# Patient Record
Sex: MALE | Race: WHITE | NOT HISPANIC OR LATINO | Employment: FULL TIME | URBAN - METROPOLITAN AREA
[De-identification: names, ages, dates, MRNs, and addresses within clinical notes are randomized per-mention and may not be internally consistent; named-entity substitution may affect disease eponyms.]

---

## 2017-08-03 ENCOUNTER — OFFICE VISIT (OUTPATIENT)
Dept: NEPHROLOGY | Facility: CLINIC | Age: 27
End: 2017-08-03
Payer: COMMERCIAL

## 2017-08-03 ENCOUNTER — LAB VISIT (OUTPATIENT)
Dept: LAB | Facility: HOSPITAL | Age: 27
End: 2017-08-03
Attending: INTERNAL MEDICINE
Payer: COMMERCIAL

## 2017-08-03 VITALS
SYSTOLIC BLOOD PRESSURE: 104 MMHG | HEIGHT: 64 IN | BODY MASS INDEX: 23.6 KG/M2 | HEART RATE: 58 BPM | DIASTOLIC BLOOD PRESSURE: 78 MMHG | WEIGHT: 138.25 LBS

## 2017-08-03 DIAGNOSIS — E83.39 HYPOPHOSPHATEMIA: Primary | ICD-10-CM

## 2017-08-03 DIAGNOSIS — E83.52 HYPERCALCEMIA: ICD-10-CM

## 2017-08-03 DIAGNOSIS — E83.39 HYPOPHOSPHATEMIA: ICD-10-CM

## 2017-08-03 PROCEDURE — 36415 COLL VENOUS BLD VENIPUNCTURE: CPT | Mod: PO

## 2017-08-03 PROCEDURE — 82306 VITAMIN D 25 HYDROXY: CPT

## 2017-08-03 PROCEDURE — 99999 PR PBB SHADOW E&M-NEW PATIENT-LVL III: CPT | Mod: PBBFAC,,, | Performed by: INTERNAL MEDICINE

## 2017-08-03 PROCEDURE — 3008F BODY MASS INDEX DOCD: CPT | Mod: S$GLB,,, | Performed by: INTERNAL MEDICINE

## 2017-08-03 PROCEDURE — 83970 ASSAY OF PARATHORMONE: CPT

## 2017-08-03 PROCEDURE — 80069 RENAL FUNCTION PANEL: CPT

## 2017-08-03 PROCEDURE — 99205 OFFICE O/P NEW HI 60 MIN: CPT | Mod: S$GLB,,, | Performed by: INTERNAL MEDICINE

## 2017-08-03 RX ORDER — CALCITRIOL 0.25 UG/1
2 CAPSULE ORAL 2 TIMES DAILY
Refills: 0 | COMMUNITY
Start: 2017-06-02 | End: 2017-08-03 | Stop reason: SDUPTHER

## 2017-08-03 RX ORDER — POTASSIUM PHOSPHATE, MONOBASIC AND SODIUM PHOSPHATE, MONOBASIC, ANHYDROUS 305; 700 MG/1; MG/1
3 TABLET, COATED ORAL 4 TIMES DAILY
Refills: 0 | COMMUNITY
Start: 2017-06-02 | End: 2017-08-03 | Stop reason: SDUPTHER

## 2017-08-03 RX ORDER — POTASSIUM PHOSPHATE, MONOBASIC AND SODIUM PHOSPHATE, MONOBASIC, ANHYDROUS 305; 700 MG/1; MG/1
3 TABLET, COATED ORAL 3 TIMES DAILY
Qty: 270 EACH | Refills: 6 | Status: SHIPPED | OUTPATIENT
Start: 2017-08-03 | End: 2017-08-30 | Stop reason: SDUPTHER

## 2017-08-03 RX ORDER — CALCITRIOL 0.25 UG/1
0.5 CAPSULE ORAL DAILY
Qty: 30 CAPSULE | Refills: 6 | Status: SHIPPED | OUTPATIENT
Start: 2017-08-03 | End: 2017-08-30 | Stop reason: SDUPTHER

## 2017-08-03 NOTE — PROGRESS NOTES
NEPHROLOGY CONSULTATION    PHYSICIAN REQUESTING THE CONSULT: Self-referred    REASON FOR CONSULTATION: Hypophosphtemia    27 y.o. male with history of hypophosphatemia, hypercalcemia presents to the renal clinic for evaluation of hypophosphatemia. Patient is self-referred. He reports long-standing history of hypophosphatemia and hypercalcemia and has been on life-long (since childhood) Calcitriol and K-Phos supplementation. Patient has seen several Nephrologists in past. He last saw Dr. Austin Gracia ([place], [place]) about 4 years ago (Physician is now retired). 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 Patient today presents to the clinic without any major complaints. Specifically, he denies any headaches, congenital hearing loss, chest pain, SOB, hemoptysis, abdominal or flank pain, diarrhea, nausea/vomiting, hematuria, dysuria, LE swelling, rashes, hand/foot paresthesia, nasal congestion, frequent fractures. Patient denies any NSAID use. Patient denies any history of HTN, DM2, heart disease, anemia, thyroid disease. He denies any surgical history. He denies any history of calcium or phosphorus abnormalities in his family. Last lab work from 5/2/2013 revealed phosphorus of 2.5, calcium of 10.9, creatinine of 1.27, potassium of 4.3, CO2 of 31.       Past Medical History:   Diagnosis Date    Hypophosphatemia        History reviewed. No pertinent surgical history.    Review of patient's allergies indicates:  No Known Allergies    Current Outpatient Prescriptions   Medication Sig Dispense Refill    calcitRIOL (ROCALTROL) 0.25 MCG Cap Take 2 capsules (0.5 mcg total) by mouth once daily. 30 capsule 6    K-PHOS NO 2 305-700 mg Tab Take 3 tablets by mouth 3 (three) times daily. 270 each 6     No current facility-administered medications for this visit.        Family History   Problem Relation Age of Onset    Anemia Neg Hx     Cancer  "Neg Hx     Hypertension Neg Hx     Kidney disease Neg Hx        Social History     Social History    Marital status: Single     Spouse name: N/A    Number of children: N/A    Years of education: N/A     Occupational History    Not on file.     Social History Main Topics    Smoking status: Not on file    Smokeless tobacco: Not on file    Alcohol use Not on file    Drug use: Unknown    Sexual activity: Not on file     Other Topics Concern    Not on file     Social History Narrative    No narrative on file       Review of Systems:  1. GENERAL: patient denies any fever, weight changes, generalized weakness, dizziness.  2. HEENT: patient denies headaches, visual disturbances, swallowing problems, sinus pain, nasal congestion.  3. CARDIOVASCULAR: patient denies chest pain, palpitations.  4. PULMONARY: patient denies SOB, coughing, hemoptysis, wheezing.  5. GASTROINTESTINAL: patient denies abdominal pain, nausea, vomiting, diarrhea.  6. GENITOURINARY: patient denies dysuria, hematuria, hesitancy, frequency.  7. EXTREMITIES: patient denies LE edema, LE cramping.  8. DERMATOLOGY: patient denies rashes, ulcers.  9. NEURO: patient denies tremors, extremity weakness, extremity numbness/tingling.  10. MUSCULOSKELETAL: patient denies joint pain, joint swelling.  11. HEMATOLOGY: patient denies rectal or gum bleeding.  12: PSYCH: patient denies anxiety, depression.      PHYSICAL EXAM:    /78   Pulse (!) 58   Ht 5' 4" (1.626 m)   Wt 62.7 kg (138 lb 3.7 oz)   BMI 23.73 kg/m²     GENERAL: Pleasant skinny gentleman presents to clinic with non-labored breathing.  HEENT: PER, no nasal discharge, no icterus, no oral exudates, moist mucosal membranes.  NECK: no thyroid mass, no lymphadenopathy.  HEART: RRR S1/S2, no rubs, good peripheral pulses.  LUNGS: CTA bilaterally, no wheezing, breathing is nonlabored.  ABDOMEN: soft, nontender, not distended, bowel sounds are present, no abdominal hernia.  EXTREM: no LE " edema.  SKIN: no rashes, skin is warm and dry.  NEURO: A & O x 3, no obvious focal signs.    LABORATORY RESULTS:    OUTSIDE LABS from 5/2/13:  Na-140, K-4.3, CO2-31, BUN-17, Cr-1.27, Ca-10.9, Alb-4.3, P-2.5, Mg-1.9, Uric acid-7.8    From 12/10/12:  25-OH Vitamin D: 26    Renal US from 4/8/12:  Right kidney 9.6 cm, left kidney 11.3 cm, no hydronephrosis or calculi, left simple cyst.           ASSESSMENT AND PLAN:  27 y.o. male with history of hypophosphatemia, hypercalcemia presents to the renal clinic for evaluation of hypophosphatemia.    1. Hypophospatemia: Patient presents with history of mild hypophosphatemia and mild hypercalcemia. He reports that he has been on medications since childhood and currently uses Rocaltrol and K-Phos supplements. Patient presents medical records from various physicians that he has seen in past. Dr. 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 However, formal genetic testing has never been done as per patient. Patient also denies any family history of calcium or phosphorus abnormalities. Patient's electrolytes will be monitored closely and he will return to clinic in 2-3 weeks for follow up. Will order renal panel, urinalysis, PTH level, vitamin D level and get 24 urine for calcium and phosphorus.    2. Renal: past labs indicate stable renal function with last creatinine from 5/2/13 at 1.27. Will get repeat renal panel.     3. Acid base status: No acute issues. Will follow up with repeat renal panel.     4. Volume: Euvolemic.     5. Hypertension: Good BP control.     6. Medications: Reviewed. Agree with current medical regimen.       Thank you very much for this consult. Please see my note in Epic for recommendations.    Total time spent was about 45 min. 50 % or more was spend on counseling about treatment options disease etiology. Level 5 consult.

## 2017-08-04 LAB
25(OH)D3+25(OH)D2 SERPL-MCNC: 28 NG/ML
ALBUMIN SERPL BCP-MCNC: 4.1 G/DL
ANION GAP SERPL CALC-SCNC: 11 MMOL/L
BUN SERPL-MCNC: 19 MG/DL
CALCIUM SERPL-MCNC: 10.1 MG/DL
CHLORIDE SERPL-SCNC: 105 MMOL/L
CO2 SERPL-SCNC: 23 MMOL/L
CREAT SERPL-MCNC: 1.3 MG/DL
EST. GFR  (AFRICAN AMERICAN): >60 ML/MIN/1.73 M^2
EST. GFR  (NON AFRICAN AMERICAN): >60 ML/MIN/1.73 M^2
GLUCOSE SERPL-MCNC: 71 MG/DL
PHOSPHATE SERPL-MCNC: 2.5 MG/DL
POTASSIUM SERPL-SCNC: 4.4 MMOL/L
PTH-INTACT SERPL-MCNC: 344 PG/ML
SODIUM SERPL-SCNC: 139 MMOL/L

## 2017-08-07 ENCOUNTER — TELEPHONE (OUTPATIENT)
Dept: PHARMACY | Facility: CLINIC | Age: 27
End: 2017-08-07

## 2017-08-08 ENCOUNTER — LAB VISIT (OUTPATIENT)
Dept: LAB | Facility: HOSPITAL | Age: 27
End: 2017-08-08
Attending: INTERNAL MEDICINE
Payer: COMMERCIAL

## 2017-08-08 DIAGNOSIS — E83.39 HYPOPHOSPHATEMIA: ICD-10-CM

## 2017-08-08 DIAGNOSIS — E83.52 HYPERCALCEMIA: ICD-10-CM

## 2017-08-08 PROCEDURE — 82340 ASSAY OF CALCIUM IN URINE: CPT

## 2017-08-08 PROCEDURE — 84105 ASSAY OF URINE PHOSPHORUS: CPT

## 2017-08-09 LAB
CALCIUM 24H UR-MRATE: 1 MG/HR
CALCIUM UR-MCNC: 1.7 MG/DL
CALCIUM URINE (MG/SPEC): 31 MG/SPEC
PHOSPHATE 24H UR-MRATE: 74 MG/HR
PHOSPHATE UR-MCNC: 96 MG/DL
PHOSPHORUS, URINE (MG/SPEC): 1776 MG/SPEC
URINE COLLECTION DURATION: 24 HR
URINE COLLECTION DURATION: 24 HR
URINE VOLUME: 1850 ML
URINE VOLUME: 1850 ML

## 2017-08-11 ENCOUNTER — TELEPHONE (OUTPATIENT)
Dept: NEPHROLOGY | Facility: CLINIC | Age: 27
End: 2017-08-11

## 2017-08-11 NOTE — TELEPHONE ENCOUNTER
Called pt. To reschedule appt. With Dr. Bailey on 8/22 as Dr. Bailey will be out of the office, no answer, left detailed voice message with this info. Advised pt. To return call to reschedule or reschedule through my [facility].

## 2017-08-30 ENCOUNTER — OFFICE VISIT (OUTPATIENT)
Dept: NEPHROLOGY | Facility: CLINIC | Age: 27
End: 2017-08-30
Payer: COMMERCIAL

## 2017-08-30 VITALS
SYSTOLIC BLOOD PRESSURE: 118 MMHG | WEIGHT: 134.69 LBS | BODY MASS INDEX: 22.99 KG/M2 | HEIGHT: 64 IN | DIASTOLIC BLOOD PRESSURE: 62 MMHG

## 2017-08-30 DIAGNOSIS — E83.39 HYPOPHOSPHATEMIA: ICD-10-CM

## 2017-08-30 DIAGNOSIS — E21.0 HYPERPARATHYROIDISM, PRIMARY: Primary | ICD-10-CM

## 2017-08-30 PROCEDURE — 99214 OFFICE O/P EST MOD 30 MIN: CPT | Mod: S$GLB,,, | Performed by: INTERNAL MEDICINE

## 2017-08-30 PROCEDURE — 99999 PR PBB SHADOW E&M-EST. PATIENT-LVL III: CPT | Mod: PBBFAC,,, | Performed by: INTERNAL MEDICINE

## 2017-08-30 PROCEDURE — 3008F BODY MASS INDEX DOCD: CPT | Mod: S$GLB,,, | Performed by: INTERNAL MEDICINE

## 2017-08-30 RX ORDER — POTASSIUM PHOSPHATE, MONOBASIC AND SODIUM PHOSPHATE, MONOBASIC, ANHYDROUS 305; 700 MG/1; MG/1
3 TABLET, COATED ORAL 3 TIMES DAILY
Qty: 270 EACH | Refills: 6 | Status: SHIPPED | OUTPATIENT
Start: 2017-08-30 | End: 2017-10-18 | Stop reason: SDUPTHER

## 2017-08-30 RX ORDER — CALCITRIOL 0.25 UG/1
0.5 CAPSULE ORAL DAILY
Qty: 30 CAPSULE | Refills: 6 | Status: SHIPPED | OUTPATIENT
Start: 2017-08-30 | End: 2017-10-18 | Stop reason: SDUPTHER

## 2017-08-30 NOTE — PROGRESS NOTES
PROGRESS NOTE FOR ESTABLISHED PATIENT    PHYSICIAN REQUESTING THE CONSULT: Self-referred    REASON FOR CONSULTATION: Hypophosphtemia    27 y.o. male with history of hypophosphatemia, hypercalcemia presents to the renal clinic for evaluation of hypophosphatemia.     Patient has no complaints today.           Past Medical History:   Diagnosis Date    Hypophosphatemia        No past surgical history on file.    Review of patient's allergies indicates:  No Known Allergies    Current Outpatient Prescriptions   Medication Sig Dispense Refill    calcitRIOL (ROCALTROL) 0.25 MCG Cap Take 2 capsules (0.5 mcg total) by mouth once daily. 30 capsule 6    K-PHOS NO 2 305-700 mg Tab Take 3 tablets by mouth 3 (three) times daily. 270 each 6     No current facility-administered medications for this visit.        Family History   Problem Relation Age of Onset    Anemia Neg Hx     Cancer Neg Hx     Hypertension Neg Hx     Kidney disease Neg Hx        Social History     Social History    Marital status: Single     Spouse name: N/A    Number of children: N/A    Years of education: N/A     Occupational History    Not on file.     Social History Main Topics    Smoking status: Not on file    Smokeless tobacco: Not on file    Alcohol use Not on file    Drug use: Unknown    Sexual activity: Not on file     Other Topics Concern    Not on file     Social History Narrative    No narrative on file       Review of Systems:  1. GENERAL: patient denies any fever, weight changes, generalized weakness, dizziness.  2. HEENT: patient denies headaches, visual disturbances, swallowing problems, sinus pain, nasal congestion.  3. CARDIOVASCULAR: patient denies chest pain, palpitations.  4. PULMONARY: patient denies SOB, coughing, hemoptysis, wheezing.  5. GASTROINTESTINAL: patient denies abdominal pain, nausea, vomiting, diarrhea.  6. GENITOURINARY: patient denies dysuria, hematuria, hesitancy, frequency.  7. EXTREMITIES: patient denies  "LE edema, LE cramping.  8. DERMATOLOGY: patient denies rashes, ulcers.  9. NEURO: patient denies tremors, extremity weakness, extremity numbness/tingling.  10. MUSCULOSKELETAL: patient denies joint pain, joint swelling.  11. HEMATOLOGY: patient denies rectal or gum bleeding.  12: PSYCH: patient denies anxiety, depression.      PHYSICAL EXAM:    /62   Ht 5' 4" (1.626 m)   Wt 61.1 kg (134 lb 11.2 oz)   BMI 23.12 kg/m²     GENERAL: Pleasant skinny gentleman presents to clinic with non-labored breathing.  HEENT: PER, no nasal discharge, no icterus, no oral exudates, moist mucosal membranes.  NECK: no thyroid mass, no lymphadenopathy.  HEART: RRR S1/S2, no rubs, good peripheral pulses.  LUNGS: CTA bilaterally, no wheezing, breathing is nonlabored.  ABDOMEN: soft, nontender, not distended, bowel sounds are present, no abdominal hernia.  EXTREM: no LE edema.  SKIN: no rashes, skin is warm and dry.  NEURO: A & O x 3, no obvious focal signs.    LABORATORY RESULTS:    Lab Results   Component Value Date    CREATININE 1.3 08/03/2017    BUN 19 08/03/2017     08/03/2017    K 4.4 08/03/2017     08/03/2017    CO2 23 08/03/2017     Lab Results   Component Value Date    .0 (H) 08/03/2017    CALCIUM 10.1 08/03/2017    PHOS 2.5 (L) 08/03/2017     Lab Results   Component Value Date    ALBUMIN 4.1 08/03/2017     No results found for: WBC, HGB, HCT, MCV, PLT     24 urinary phosphorus:  74 mg/hr (high)      OUTSIDE LABS from 5/2/13:  Na-140, K-4.3, CO2-31, BUN-17, Cr-1.27, Ca-10.9, Alb-4.3, P-2.5, Mg-1.9, Uric acid-7.8    From 12/10/12:  25-OH Vitamin D: 26    Renal US from 4/8/12:  Right kidney 9.6 cm, left kidney 11.3 cm, no hydronephrosis or calculi, left simple cyst.           ASSESSMENT AND PLAN:  27 y.o. male with history of hypophosphatemia, hypercalcemia presents to the renal clinic for evaluation of hypophosphatemia.    1. Hypophospatemia: Patient presents with history of mild hypophosphatemia and mild " hypercalcemia. Hypercalcemia has now resolved. PTH is elevated at 344. Patient's results are most consistent with primary hyperparathyroidism. Patient will continue Calcitriol and K-Phos supplements for now. Will make referral to Endocrinology for future follow up. Possibility of surgical intervention (parathyroidectomy) was discussed.     2. Renal: stable with creatinine at 1.3.     3. Acid base status: No acute issues.     4. Volume: Euvolemic.     5. Hypertension: Good BP control.     6. Medications: Reviewed. Agree with current medical regimen.

## 2017-10-18 DIAGNOSIS — E83.39 HYPOPHOSPHATEMIA: ICD-10-CM

## 2017-10-18 RX ORDER — POTASSIUM PHOSPHATE, MONOBASIC AND SODIUM PHOSPHATE, MONOBASIC, ANHYDROUS 305; 700 MG/1; MG/1
3 TABLET, COATED ORAL 3 TIMES DAILY
Qty: 270 EACH | Refills: 11 | Status: SHIPPED | OUTPATIENT
Start: 2017-10-18 | End: 2017-11-09 | Stop reason: SDUPTHER

## 2017-10-18 RX ORDER — CALCITRIOL 0.25 UG/1
0.5 CAPSULE ORAL DAILY
Qty: 60 CAPSULE | Refills: 11 | Status: SHIPPED | OUTPATIENT
Start: 2017-10-18 | End: 2018-01-24 | Stop reason: SDUPTHER

## 2017-11-01 ENCOUNTER — PATIENT MESSAGE (OUTPATIENT)
Dept: NEPHROLOGY | Facility: CLINIC | Age: 27
End: 2017-11-01

## 2017-11-09 DIAGNOSIS — E83.39 HYPOPHOSPHATEMIA: ICD-10-CM

## 2017-11-09 RX ORDER — POTASSIUM PHOSPHATE, MONOBASIC AND SODIUM PHOSPHATE, MONOBASIC, ANHYDROUS 305; 700 MG/1; MG/1
3 TABLET, COATED ORAL 3 TIMES DAILY
Qty: 837 EACH | Refills: 3
Start: 2017-11-09 | End: 2018-10-01 | Stop reason: SDUPTHER

## 2017-11-29 DIAGNOSIS — E83.39 HYPOPHOSPHATEMIA: ICD-10-CM

## 2017-11-29 RX ORDER — POTASSIUM PHOSPHATE, MONOBASIC AND SODIUM PHOSPHATE, MONOBASIC, ANHYDROUS 305; 700 MG/1; MG/1
3 TABLET, COATED ORAL 3 TIMES DAILY
Qty: 810 EACH | Refills: 3 | Status: SHIPPED | OUTPATIENT
Start: 2017-11-29 | End: 2021-10-06

## 2018-01-24 DIAGNOSIS — E83.39 HYPOPHOSPHATEMIA: ICD-10-CM

## 2018-01-24 RX ORDER — CALCITRIOL 0.25 UG/1
0.5 CAPSULE ORAL DAILY
Qty: 180 CAPSULE | Refills: 3 | OUTPATIENT
Start: 2018-01-24

## 2018-01-24 RX ORDER — CALCITRIOL 0.25 UG/1
0.5 CAPSULE ORAL DAILY
Qty: 180 CAPSULE | Refills: 3 | Status: SHIPPED | OUTPATIENT
Start: 2018-01-24 | End: 2021-10-06

## 2018-07-06 ENCOUNTER — PATIENT MESSAGE (OUTPATIENT)
Dept: ENDOCRINOLOGY | Facility: CLINIC | Age: 28
End: 2018-07-06

## 2018-07-09 ENCOUNTER — PATIENT MESSAGE (OUTPATIENT)
Dept: ENDOCRINOLOGY | Facility: CLINIC | Age: 28
End: 2018-07-09

## 2018-07-25 ENCOUNTER — OFFICE VISIT (OUTPATIENT)
Dept: ENDOCRINOLOGY | Facility: CLINIC | Age: 28
End: 2018-07-25
Payer: COMMERCIAL

## 2018-07-25 ENCOUNTER — LAB VISIT (OUTPATIENT)
Dept: LAB | Facility: HOSPITAL | Age: 28
End: 2018-07-25
Attending: INTERNAL MEDICINE
Payer: COMMERCIAL

## 2018-07-25 VITALS
HEART RATE: 57 BPM | WEIGHT: 133.19 LBS | BODY MASS INDEX: 22.74 KG/M2 | HEIGHT: 64 IN | TEMPERATURE: 97 F | SYSTOLIC BLOOD PRESSURE: 116 MMHG | DIASTOLIC BLOOD PRESSURE: 82 MMHG

## 2018-07-25 DIAGNOSIS — E21.3 HYPERPARATHYROIDISM: ICD-10-CM

## 2018-07-25 DIAGNOSIS — E83.39 HYPOPHOSPHATEMIA: Primary | ICD-10-CM

## 2018-07-25 DIAGNOSIS — E83.39 HYPOPHOSPHATEMIA: ICD-10-CM

## 2018-07-25 PROCEDURE — 83970 ASSAY OF PARATHORMONE: CPT

## 2018-07-25 PROCEDURE — 83735 ASSAY OF MAGNESIUM: CPT

## 2018-07-25 PROCEDURE — 36415 COLL VENOUS BLD VENIPUNCTURE: CPT | Mod: PO

## 2018-07-25 PROCEDURE — 84100 ASSAY OF PHOSPHORUS: CPT

## 2018-07-25 PROCEDURE — 99999 PR PBB SHADOW E&M-EST. PATIENT-LVL III: CPT | Mod: PBBFAC,,, | Performed by: INTERNAL MEDICINE

## 2018-07-25 PROCEDURE — 99244 OFF/OP CNSLTJ NEW/EST MOD 40: CPT | Mod: S$GLB,,, | Performed by: INTERNAL MEDICINE

## 2018-07-25 PROCEDURE — 82306 VITAMIN D 25 HYDROXY: CPT

## 2018-07-25 PROCEDURE — 80053 COMPREHEN METABOLIC PANEL: CPT

## 2018-07-25 NOTE — LETTER
July 30, 2018      Salinas Bailey MD  [address]  [place] [address]           [facility] - Endocrinology  [address]  [address]  [place] [address]  Phone: [phone]  Fax: [phone]          Patient: Alex Stringer   MR Number: [id number]   YOB: 1990   Date of Visit: 7/25/2018       Dear Dr. Salinas Bailey:    Thank you for referring Alex Stringer to me for evaluation. Attached you will find relevant portions of my assessment and plan of care.    If you have questions, please do not hesitate to call me. I look forward to following Alex Stringer along with you.    Sincerely,    Allison Santos MD    Enclosure  CC:  No Recipients    If you would like to receive this communication electronically, please contact [email or web address] or [phone] to request more information on [facility] access.    For providers and/or their staff who would like to refer a patient to [facility], please contact us through our one-stop-shop provider referral line, [facility], at [phone].    If you feel you have received this communication in error or would no longer like to receive these types of communications, please e-mail [email or web address]

## 2018-07-25 NOTE — PROGRESS NOTES
""This note will be shared with the patient"Subjective:       Patient ID: Alex Stringer is a 28 y.o. male.  Patient is new to me  Chief Complaint: Hyperparathyroidism    HPI    Consultation was requested by Dr. Salinas Bailey    Patient carries a diagnosis of X linked hypophosphatemia since childhood.  Records were reviewed.  He has not had the genetic studies however he has been seen by many specialists and he says usually by nephrologist.  He was followed by doctors at [facility] when he lived in [place].  He now lives in the [place] area.      He used to be on much more frequent and higher doses of calcitriol and phosphate(  Up to 2 g 6  times a day according to his  Notes from his pediatric nephrologist written in  January 2004) but now he takes about 2 capsules of his calcitriol daily and his potassium phosphate he tries to take 3 tablets twice a day.      He denies any bone pain or fractures.  He did have bowing of his lower extremities growing  Up, but he says he did not have any severe dental problems     since living in [place] he has just had some time adjusting and knows he needs to do more socially, has been having some stress and anxiety but denies depression     when he was last seen by Dr. Bailey  It was noticed that his parathyroid hormone  Was elevated at 344, his calcium was at the higher end of normal and he had elevated 24 urine phosphorus         in review of records his last renal ultrasound was April 2012 and it showed no nephrolithiasis, he had a 1 cm left sided renal cyst at the lower pole.  Today he appears a bit anxious and he states he would rather not ever get another renal ultrasound.  He has had several in the past and the 1 prior to 2012 was in 2008 according to what scanned in.     in review of records to prior to coming to [place] he was seeing a nephrologist in [place] and his calcium started becoming mildly elevated around 10.5 in 2011 and it appears his calcitriol dose " would be adjusted downward.  I do not see any parathyroid hormone levels other than what was done here at [facility]     he has been more tired and fatigued lately  I have reviewed the past medical, family and social history  Review of Systems   Constitutional: Positive for fatigue. Negative for appetite change, diaphoresis, fever and unexpected weight change.   HENT: Negative for sore throat and trouble swallowing.    Eyes: Negative for visual disturbance.   Respiratory: Negative for shortness of breath and wheezing.    Cardiovascular: Negative for chest pain, palpitations and leg swelling.   Gastrointestinal: Negative for abdominal pain, diarrhea, nausea and vomiting.   Endocrine: Negative for cold intolerance, heat intolerance, polydipsia, polyphagia and polyuria.   Genitourinary: Negative for difficulty urinating and dysuria.   Musculoskeletal: Negative for arthralgias and joint swelling.   Skin: Negative for color change and rash.   Neurological: Negative for dizziness, tremors, numbness and headaches.   Hematological: Negative for adenopathy.   Psychiatric/Behavioral: Negative for confusion, dysphoric mood and sleep disturbance. The patient is not nervous/anxious.        Objective:      Physical Exam   Constitutional: He is oriented to person, place, and time. He appears well-developed and well-nourished. No distress.   HENT:   Head: Normocephalic and atraumatic.   Right Ear: External ear normal.   Left Ear: External ear normal.   Mouth/Throat: Oropharynx is clear and moist. No oropharyngeal exudate.   He does appear to have craniosynostosis     Eyes: Conjunctivae and EOM are normal. Pupils are equal, round, and reactive to light. No scleral icterus.   Neck: No tracheal deviation present. No thyromegaly present.   Cardiovascular: Normal rate, regular rhythm, normal heart sounds and intact distal pulses.  Exam reveals no gallop and no friction rub.    No murmur heard.  Pulmonary/Chest: Effort normal and breath  sounds normal. No respiratory distress. He has no wheezes. He has no rales.   Abdominal: Soft. Bowel sounds are normal. He exhibits no distension and no mass. There is no tenderness. There is no rebound and no guarding. No hernia.   Musculoskeletal: He exhibits deformity. He exhibits no edema.   He does have some noticeable bowing of his lower extremities   Lymphadenopathy:     He has no cervical adenopathy.   Neurological: He is alert and oriented to person, place, and time. He has normal reflexes. No cranial nerve deficit.   Skin: Skin is warm and dry. No rash noted. He is not diaphoretic. No erythema.   Psychiatric:   He does have somewhat of an anxious affect   Vitals reviewed.        Lab Review:     Results for AINSLEY MONGE (MRN [id number]) as of 7/30/2018 13:26   Ref. Range 8/3/2017 16:56   Sodium Latest Ref Range: 136 - 145 mmol/L 139   Potassium Latest Ref Range: 3.5 - 5.1 mmol/L 4.4   Chloride Latest Ref Range: 95 - 110 mmol/L 105   CO2 Latest Ref Range: 23 - 29 mmol/L 23   Anion Gap Latest Ref Range: 8 - 16 mmol/L 11   BUN, Bld Latest Ref Range: 6 - 20 mg/dL 19   Creatinine Latest Ref Range: 0.5 - 1.4 mg/dL 1.3   eGFR if non African American Latest Ref Range: >60 mL/min/1.73 m^2 >60.0   eGFR if African American Latest Ref Range: >60 mL/min/1.73 m^2 >60.0   Glucose Latest Ref Range: 70 - 110 mg/dL 71   Calcium Latest Ref Range: 8.7 - 10.5 mg/dL 10.1   Phosphorus Latest Ref Range: 2.7 - 4.5 mg/dL 2.5 (L)   Albumin Latest Ref Range: 3.5 - 5.2 g/dL 4.1   Vit D, 25-Hydroxy Latest Ref Range: 30 - 96 ng/mL 28 (L)   PTH Latest Ref Range: 9.0 - 77.0 pg/mL 344.0 (H)       Assessment:     1. Hypophosphatemia  Comprehensive metabolic panel    Calcium, Timed Urine    Creatinine, urine, timed    Vitamin D    PTH, intact    Phosphorus    MAGNESIUM    Phosphorus, urine, timed   2. Hyperparathyroidism  Comprehensive metabolic panel    Calcium, Timed Urine    Creatinine, urine, timed    Vitamin D    PTH, intact     Phosphorus    MAGNESIUM    Phosphorus, urine, timed           patient with history of X-linked hypophosphatemia( which is associated with abnormalities in the PHEX gene) who now has  Hyperparathyroidism based on last labs that were done in 2017  In patients with this disorder who have become adults it is thought that the no longer need to be treated daily with medication unless they have symptoms such as pain, fracture or unless there having a pending surgery and in that case they can start treatment 3-6 months prior to their procedure.  Often the treatment which includes Calcitriol and phosphate can lead to  Secondary and tertiary   Hyperparathyroidism     I will re-evaluate his blood work as well as his 24 hr urine and if he again shows evidence of hyperparathyroidism especially with hypercalcemia as he had in the past I will have to recommend to Lower or consider stopping some or both of his supplements.      I also wanted to get an updated renal ultrasound to evaluate for nephrocalcinosis as if he were to have this it can make him more at risk for complications such as renal insufficiency however he was adamant that he does not wish to have a non other imaging study at this time and he also would not like to have many office visits but I discussed with him that based on the  The complexity of his disease which up until this point had been more stable he may require more frequent office visits as well as more frequent lab testing and patient voiced understanding to this and he also stated that he would not mind getting imaging testing if he had evidence of hypercalcemia or hyperparathyroidism     I will also  Consider ordering FGF 23  Levels as these are often elevated in this condition in the future  Plan:   Hypophosphatemia  -     Comprehensive metabolic panel; Future; Expected date: 07/25/2018  -     Calcium, Timed Urine; Future  -     Creatinine, urine, timed; Future  -     Vitamin D; Future; Expected date:  07/25/2018  -     PTH, intact; Future; Expected date: 07/25/2018  -     Phosphorus; Future; Expected date: 07/25/2018  -     MAGNESIUM; Future; Expected date: 07/25/2018  -     Phosphorus, urine, timed; Future    Hyperparathyroidism  -     Comprehensive metabolic panel; Future; Expected date: 07/25/2018  -     Calcium, Timed Urine; Future  -     Creatinine, urine, timed; Future  -     Vitamin D; Future; Expected date: 07/25/2018  -     PTH, intact; Future; Expected date: 07/25/2018  -     Phosphorus; Future; Expected date: 07/25/2018  -     MAGNESIUM; Future; Expected date: 07/25/2018  -     Phosphorus, urine, timed; Future    Other orders  -     Cancel: US Retroperitoneal Complete; Future; Expected date: 07/25/2018           Thank you to Dr. Salinas Bailey for this consultation

## 2018-07-26 LAB
25(OH)D3+25(OH)D2 SERPL-MCNC: 28 NG/ML
ALBUMIN SERPL BCP-MCNC: 4.6 G/DL
ALP SERPL-CCNC: 55 U/L
ALT SERPL W/O P-5'-P-CCNC: 11 U/L
ANION GAP SERPL CALC-SCNC: 10 MMOL/L
AST SERPL-CCNC: 17 U/L
BILIRUB SERPL-MCNC: 1.1 MG/DL
BUN SERPL-MCNC: 18 MG/DL
CALCIUM SERPL-MCNC: 10.8 MG/DL
CHLORIDE SERPL-SCNC: 105 MMOL/L
CO2 SERPL-SCNC: 25 MMOL/L
CREAT SERPL-MCNC: 1.2 MG/DL
EST. GFR  (AFRICAN AMERICAN): >60 ML/MIN/1.73 M^2
EST. GFR  (NON AFRICAN AMERICAN): >60 ML/MIN/1.73 M^2
GLUCOSE SERPL-MCNC: 78 MG/DL
MAGNESIUM SERPL-MCNC: 2 MG/DL
PHOSPHATE SERPL-MCNC: 2.9 MG/DL
POTASSIUM SERPL-SCNC: 4.6 MMOL/L
PROT SERPL-MCNC: 7.9 G/DL
PTH-INTACT SERPL-MCNC: 360 PG/ML
SODIUM SERPL-SCNC: 140 MMOL/L

## 2018-07-31 ENCOUNTER — PATIENT MESSAGE (OUTPATIENT)
Dept: ENDOCRINOLOGY | Facility: CLINIC | Age: 28
End: 2018-07-31

## 2018-07-31 DIAGNOSIS — E83.39 HYPOPHOSPHATEMIA: Primary | ICD-10-CM

## 2018-07-31 DIAGNOSIS — E83.52 HYPERCALCEMIA: ICD-10-CM

## 2018-07-31 DIAGNOSIS — E21.3 HYPERPARATHYROIDISM: ICD-10-CM

## 2018-08-01 ENCOUNTER — TELEPHONE (OUTPATIENT)
Dept: ENDOCRINOLOGY | Facility: CLINIC | Age: 28
End: 2018-08-01

## 2018-08-06 ENCOUNTER — LAB VISIT (OUTPATIENT)
Dept: LAB | Facility: HOSPITAL | Age: 28
End: 2018-08-06
Attending: INTERNAL MEDICINE
Payer: COMMERCIAL

## 2018-08-06 DIAGNOSIS — E83.39 HYPOPHOSPHATEMIA: ICD-10-CM

## 2018-08-06 DIAGNOSIS — E21.3 HYPERPARATHYROIDISM: ICD-10-CM

## 2018-08-06 PROCEDURE — 82340 ASSAY OF CALCIUM IN URINE: CPT

## 2018-08-06 PROCEDURE — 82570 ASSAY OF URINE CREATININE: CPT

## 2018-08-06 PROCEDURE — 84105 ASSAY OF URINE PHOSPHORUS: CPT

## 2018-08-07 LAB
CALCIUM 24H UR-MRATE: 3 MG/HR
CALCIUM UR-MCNC: 4.6 MG/DL
CALCIUM URINE (MG/SPEC): 83 MG/SPEC
CREAT 24H UR-MRATE: 51 MG/HR
CREAT UR-MCNC: 68 MG/DL
CREATININE, URINE (MG/SPEC): 1224 MG/SPEC
PHOSPHATE 24H UR-MRATE: 53.3 MG/HR
PHOSPHATE UR-MCNC: 71 MG/DL
PHOSPHORUS, URINE (MG/SPEC): 1278 MG/SPEC
URINE COLLECTION DURATION: 24 HR
URINE VOLUME: 1800 ML

## 2018-08-14 ENCOUNTER — PATIENT MESSAGE (OUTPATIENT)
Dept: ENDOCRINOLOGY | Facility: CLINIC | Age: 28
End: 2018-08-14

## 2018-08-28 ENCOUNTER — LAB VISIT (OUTPATIENT)
Dept: LAB | Facility: HOSPITAL | Age: 28
End: 2018-08-28
Attending: INTERNAL MEDICINE
Payer: COMMERCIAL

## 2018-08-28 DIAGNOSIS — E83.39 HYPOPHOSPHATEMIA: ICD-10-CM

## 2018-08-28 DIAGNOSIS — E21.3 HYPERPARATHYROIDISM: ICD-10-CM

## 2018-08-28 DIAGNOSIS — E83.52 HYPERCALCEMIA: ICD-10-CM

## 2018-08-28 LAB
25(OH)D3+25(OH)D2 SERPL-MCNC: 29 NG/ML
ANION GAP SERPL CALC-SCNC: 7 MMOL/L
BUN SERPL-MCNC: 20 MG/DL
CALCIUM SERPL-MCNC: 10.4 MG/DL
CHLORIDE SERPL-SCNC: 104 MMOL/L
CO2 SERPL-SCNC: 26 MMOL/L
CREAT SERPL-MCNC: 1.2 MG/DL
EST. GFR  (AFRICAN AMERICAN): >60 ML/MIN/1.73 M^2
EST. GFR  (NON AFRICAN AMERICAN): >60 ML/MIN/1.73 M^2
GLUCOSE SERPL-MCNC: 82 MG/DL
MAGNESIUM SERPL-MCNC: 2.2 MG/DL
PHOSPHATE SERPL-MCNC: 1.3 MG/DL
POTASSIUM SERPL-SCNC: 4.3 MMOL/L
PTH-INTACT SERPL-MCNC: 373 PG/ML
SODIUM SERPL-SCNC: 137 MMOL/L

## 2018-08-28 PROCEDURE — 82306 VITAMIN D 25 HYDROXY: CPT

## 2018-08-28 PROCEDURE — 83735 ASSAY OF MAGNESIUM: CPT

## 2018-08-28 PROCEDURE — 83970 ASSAY OF PARATHORMONE: CPT

## 2018-08-28 PROCEDURE — 80048 BASIC METABOLIC PNL TOTAL CA: CPT

## 2018-08-28 PROCEDURE — 84100 ASSAY OF PHOSPHORUS: CPT

## 2018-08-28 PROCEDURE — 83520 IMMUNOASSAY QUANT NOS NONAB: CPT

## 2018-09-04 LAB — FIBROBLAST GROWTH FACTOR 23: 1225 RU/ML

## 2018-09-05 ENCOUNTER — PATIENT MESSAGE (OUTPATIENT)
Dept: ENDOCRINOLOGY | Facility: CLINIC | Age: 28
End: 2018-09-05

## 2018-09-20 ENCOUNTER — PATIENT MESSAGE (OUTPATIENT)
Dept: ENDOCRINOLOGY | Facility: CLINIC | Age: 28
End: 2018-09-20

## 2018-10-01 ENCOUNTER — OFFICE VISIT (OUTPATIENT)
Dept: ENDOCRINOLOGY | Facility: CLINIC | Age: 28
End: 2018-10-01
Payer: COMMERCIAL

## 2018-10-01 VITALS
TEMPERATURE: 98 F | HEIGHT: 64 IN | HEART RATE: 53 BPM | WEIGHT: 133.19 LBS | SYSTOLIC BLOOD PRESSURE: 120 MMHG | DIASTOLIC BLOOD PRESSURE: 84 MMHG | BODY MASS INDEX: 22.74 KG/M2

## 2018-10-01 DIAGNOSIS — E21.3 HYPERPARATHYROIDISM: ICD-10-CM

## 2018-10-01 DIAGNOSIS — E83.39 HYPOPHOSPHATEMIA: Primary | ICD-10-CM

## 2018-10-01 DIAGNOSIS — E83.52 HYPERCALCEMIA: ICD-10-CM

## 2018-10-01 PROCEDURE — 99999 PR PBB SHADOW E&M-EST. PATIENT-LVL III: CPT | Mod: PBBFAC,,, | Performed by: INTERNAL MEDICINE

## 2018-10-01 PROCEDURE — 99214 OFFICE O/P EST MOD 30 MIN: CPT | Mod: S$GLB,,, | Performed by: INTERNAL MEDICINE

## 2018-10-01 PROCEDURE — 3008F BODY MASS INDEX DOCD: CPT | Mod: CPTII,S$GLB,, | Performed by: INTERNAL MEDICINE

## 2018-10-01 NOTE — PROGRESS NOTES
Patient ID: Alex Stringer is a 28 y.o. male.  Patient is here for follow up        Chief Complaint: Hyperparathyroidism (Hypophosphatemia)      HPI    Consultation was requested by Dr. Salinas Bailey    Patient carries a diagnosis of X linked hypophosphatemia since childhood.  Records were reviewed.  He has not had the genetic studies however he has been seen by many specialists and he says usually by nephrologist.  He was followed by doctors at [facility] when he lived in [place].  He now lives in the [place] area.      He used to be on much more frequent and higher doses of calcitriol and phosphate(  Up to 2 g 6  times a day according to his  Notes from his pediatric nephrologist written in  January 2004) but when I saw him initially in July 2018 he reported taking  about 2 capsules of his calcitriol daily and his potassium phosphate he tries to take 3 tablets twice a day.      He denies any bone pain or fractures.  He did have bowing of his lower extremities growing  Up, but he says he did not have any severe dental problems     since living in [place] he has just had some time adjusting and knows he needs to do more socially, has been having some stress and anxiety but denies depression     when he was last seen by Dr. Bailey  It was noticed that his parathyroid hormone  Was elevated at 344, his calcium was at the higher end of normal and he had elevated 24 urine phosphorus    After last visit I repeated his PTH and it was again high in the 300s and his calcium was mild Geeta elevated at 10.8.  His 24 urine was not elevated.  I recommended getting a renal ultrasound but he refused and I also recommended stopping his medication as he is an adult now and has completed his bone formation and his therapy of calcitriol and phosphate in the presence of hyperparathyroidism may worsen that condition and would only be needed should he have issues with persistent bone pain or has evidence of fractures or if he is in  need of Orthopedic surgery but he was reluctant to stop his medication completely        His Fibroblast growth factor 23 was very elevated consistent with his disease process         in review of records his last renal ultrasound was April 2012 and it showed no nephrolithiasis, he had a 1 cm left sided renal cyst at the lower pole.  Today he appears a bit anxious and he states he would rather not ever get another renal ultrasound.  He has had several in the past and the 1 prior to 2012 was in 2008 according to what scanned in.     in review of records to prior to coming to [place] he was seeing a nephrologist in [place] and his calcium started becoming mildly elevated around 10.5 in 2011 and it appears his calcitriol dose would be adjusted downward.  I do not see any parathyroid hormone levels other than what was done here at [facility]    He reports he takes about 5 K-Phos tablets daily and calcitriol 1 every other day and when he gets much much lower than this the only pain he experiences is pain in his teeth area.  He does not have any dental cavities    I have reviewed the past medical, family and social history    Review of Systems   Constitutional: Negative for appetite change, diaphoresis, fatigue, fever and unexpected weight change.   HENT: Negative for sore throat and trouble swallowing.    Eyes: Negative for visual disturbance.   Respiratory: Negative for shortness of breath and wheezing.    Cardiovascular: Negative for chest pain, palpitations and leg swelling.   Gastrointestinal: Negative for abdominal pain, diarrhea, nausea and vomiting.   Endocrine: Negative for cold intolerance, heat intolerance, polydipsia, polyphagia and polyuria.   Genitourinary: Negative for difficulty urinating and dysuria.   Musculoskeletal: Negative for arthralgias and joint swelling.   Skin: Negative for color change and rash.   Neurological: Negative for dizziness, tremors, numbness and headaches.   Hematological: Negative  for adenopathy.   Psychiatric/Behavioral: Negative for confusion, dysphoric mood and sleep disturbance. The patient is not nervous/anxious.        Objective:      Physical Exam   Constitutional: He appears well-developed and well-nourished. No distress.   HENT:   Head: Normocephalic and atraumatic.   Eyes: Conjunctivae are normal.   Neurological: He is alert. No sensory deficit.        Skin: Skin is warm and dry. No rash noted. He is not diaphoretic. No erythema.   Psychiatric: He has a normal mood and affect. His behavior is normal.   Vitals reviewed.        Lab Review:   Lab Visit on 08/28/2018   Component Date Value    PTH, Intact 08/28/2018 373.0*    Phosphorus 08/28/2018 1.3*    Vit D, 25-Hydroxy 08/28/2018 29*    Sodium 08/28/2018 137     Potassium 08/28/2018 4.3     Chloride 08/28/2018 104     CO2 08/28/2018 26     Glucose 08/28/2018 82     BUN, Bld 08/28/2018 20     Creatinine 08/28/2018 1.2     Calcium 08/28/2018 10.4     Anion Gap 08/28/2018 7*    eGFR if African American 08/28/2018 >60.0     eGFR if non African Amer* 08/28/2018 >60.0     Magnesium 08/28/2018 2.2     Fibroblast Growth Factor* 08/28/2018 1225*   Lab Visit on 08/06/2018   Component Date Value    Urine Volume 08/06/2018 1800     Urine Collection Duration 08/06/2018 24     Calcium, Urine 08/06/2018 4.6     Calcium, 24H Urine 08/06/2018 3*    CA Urine (mg/Spec) 08/06/2018 83     Urine Volume 08/06/2018 1800     Urine Collection Duration 08/06/2018 24     Urine, Creatinine 08/06/2018 68.0     Creatinine, Timed Urine 08/06/2018 51.0     Creatinine, Ur (mg/spec) 08/06/2018 1224.0     Urine Volume 08/06/2018 1800     Urine Collection Duration 08/06/2018 24     Urine Phosphorus 08/06/2018 71.0     Phosphorus, 24H Ur 08/06/2018 53.3     Phos, Ur mg/spec 08/06/2018 1278.0    Lab Visit on 07/25/2018   Component Date Value    Sodium 07/25/2018 140     Potassium 07/25/2018 4.6     Chloride 07/25/2018 105     CO2  07/25/2018 25     Glucose 07/25/2018 78     BUN, Bld 07/25/2018 18     Creatinine 07/25/2018 1.2     Calcium 07/25/2018 10.8*    Total Protein 07/25/2018 7.9     Albumin 07/25/2018 4.6     Total Bilirubin 07/25/2018 1.1*    Alkaline Phosphatase 07/25/2018 55     AST 07/25/2018 17     ALT 07/25/2018 11     Anion Gap 07/25/2018 10     eGFR if African American 07/25/2018 >60.0     eGFR if non  Amer* 07/25/2018 >60.0     Vit D, 25-Hydroxy 07/25/2018 28*    PTH, Intact 07/25/2018 360.0*    Phosphorus 07/25/2018 2.9     Magnesium 07/25/2018 2.0        Assessment:     1. Hypophosphatemia  Basic metabolic panel    Vitamin D    PTH, intact    Phosphorus    Albumin    Alkaline phosphatase, bone specific   2. Hyperparathyroidism  Basic metabolic panel    Vitamin D    PTH, intact    Phosphorus    Albumin    Alkaline phosphatase, bone specific   3. Hypercalcemia      patient with X-linked hyperphosphatemia.  He only notice that if he goes down significantly on his medication he only experiences pain in his teeth area but otherwise has no pain in his joints or extremities.  He still refuses to get imaging of his kidneys to evaluate for nephrocalcinosis.  I also discussed getting x-rays of his feet and lower extremities as a baseline to evaluate for any osteoarthritis or occult fractures even though he is asymptomatic and he declines.  Because of his hyperparathyroidism and his high normal to mildly hypercalcemia I do recommend lowering his K-Phos and he will try reducing gradually in the next 3 weeks to 0 of those tablets and he will changes calcitriol to 1 capsule daily, sometimes the Calcitriol can help to reduce the PTH level but I warned him that this might also cause elevation of serum calcium.  He will get labs below and 4 weeks    He had a question about Burosumab and I told him in adults this medication can be used in patients who may have bone pain, osteoarthritis or fractures or who may be  needing orthopedic surgery but without those conditions then this medication has not been proven to be necessary and also may be costly    I discussed complications of hypercalcemia and complications of hyperparathyroidism and ultimate treatments which can include cinacalcet and surgery/parathyroidectomy which may and the being multi parathyroid removal as parathyroid hyperplasia is often the culprit and this condition        Plan:   Hypophosphatemia  -     Basic metabolic panel; Future; Expected date: 10/01/2018  -     Vitamin D; Future; Expected date: 10/01/2018  -     PTH, intact; Future; Expected date: 10/01/2018  -     Phosphorus; Future; Expected date: 10/01/2018  -     Albumin; Future; Expected date: 10/01/2018  -     Alkaline phosphatase, bone specific; Future; Expected date: 10/01/2018    Hyperparathyroidism  -     Basic metabolic panel; Future; Expected date: 10/01/2018  -     Vitamin D; Future; Expected date: 10/01/2018  -     PTH, intact; Future; Expected date: 10/01/2018  -     Phosphorus; Future; Expected date: 10/01/2018  -     Albumin; Future; Expected date: 10/01/2018  -     Alkaline phosphatase, bone specific; Future; Expected date: 10/01/2018    Hypercalcemia          Follow-up in about 4 months (around 2/1/2019).    Labs prior to appointment? not applicable     Disclaimer:  This note may have been partially prepared using voice recognition software and  it may have not been extensively proofed, as such there could be errors within the text such as sound alike errors.

## 2018-10-08 ENCOUNTER — PATIENT MESSAGE (OUTPATIENT)
Dept: ENDOCRINOLOGY | Facility: CLINIC | Age: 28
End: 2018-10-08

## 2018-10-30 ENCOUNTER — LAB VISIT (OUTPATIENT)
Dept: LAB | Facility: HOSPITAL | Age: 28
End: 2018-10-30
Attending: INTERNAL MEDICINE
Payer: COMMERCIAL

## 2018-10-30 DIAGNOSIS — E21.3 HYPERPARATHYROIDISM: ICD-10-CM

## 2018-10-30 DIAGNOSIS — E83.39 HYPOPHOSPHATEMIA: ICD-10-CM

## 2018-10-30 LAB
25(OH)D3+25(OH)D2 SERPL-MCNC: 27 NG/ML
ALBUMIN SERPL BCP-MCNC: 4.2 G/DL
ANION GAP SERPL CALC-SCNC: 6 MMOL/L
BUN SERPL-MCNC: 19 MG/DL
CALCIUM SERPL-MCNC: 10.9 MG/DL
CHLORIDE SERPL-SCNC: 105 MMOL/L
CO2 SERPL-SCNC: 28 MMOL/L
CREAT SERPL-MCNC: 1.3 MG/DL
EST. GFR  (AFRICAN AMERICAN): >60 ML/MIN/1.73 M^2
EST. GFR  (NON AFRICAN AMERICAN): >60 ML/MIN/1.73 M^2
GLUCOSE SERPL-MCNC: 92 MG/DL
PHOSPHATE SERPL-MCNC: 2.7 MG/DL
POTASSIUM SERPL-SCNC: 4.4 MMOL/L
PTH-INTACT SERPL-MCNC: 362 PG/ML
SODIUM SERPL-SCNC: 139 MMOL/L

## 2018-10-30 PROCEDURE — 80048 BASIC METABOLIC PNL TOTAL CA: CPT

## 2018-10-30 PROCEDURE — 82306 VITAMIN D 25 HYDROXY: CPT

## 2018-10-30 PROCEDURE — 82040 ASSAY OF SERUM ALBUMIN: CPT

## 2018-10-30 PROCEDURE — 84100 ASSAY OF PHOSPHORUS: CPT

## 2018-10-30 PROCEDURE — 83970 ASSAY OF PARATHORMONE: CPT

## 2018-10-30 PROCEDURE — 84075 ASSAY ALKALINE PHOSPHATASE: CPT

## 2018-11-02 LAB — ALP BONE SERPL-MCNC: 13.6 UG/L

## 2018-12-05 ENCOUNTER — PATIENT MESSAGE (OUTPATIENT)
Dept: ENDOCRINOLOGY | Facility: CLINIC | Age: 28
End: 2018-12-05

## 2018-12-06 DIAGNOSIS — E21.0 HYPERPARATHYROIDISM, PRIMARY: ICD-10-CM

## 2018-12-06 DIAGNOSIS — E83.39 HYPOPHOSPHATEMIA: Primary | ICD-10-CM

## 2018-12-06 DIAGNOSIS — E83.52 HYPERCALCEMIA: ICD-10-CM

## 2018-12-11 ENCOUNTER — TELEPHONE (OUTPATIENT)
Dept: ENDOCRINOLOGY | Facility: CLINIC | Age: 28
End: 2018-12-11

## 2018-12-11 NOTE — TELEPHONE ENCOUNTER
----- Message from Allison Santos MD sent at 12/11/2018  8:09 AM CST -----  I am not sure if I sent this message but I ordered a PTH, BMP, and phosphorus.  Patient wanted labs schedule during the week of December 17 so you can schedule it any day that week and send him the appointment through the my [facility]

## 2018-12-18 ENCOUNTER — LAB VISIT (OUTPATIENT)
Dept: LAB | Facility: HOSPITAL | Age: 28
End: 2018-12-18
Attending: INTERNAL MEDICINE
Payer: COMMERCIAL

## 2018-12-18 DIAGNOSIS — E83.52 HYPERCALCEMIA: ICD-10-CM

## 2018-12-18 DIAGNOSIS — E21.0 HYPERPARATHYROIDISM, PRIMARY: ICD-10-CM

## 2018-12-18 DIAGNOSIS — E83.39 HYPOPHOSPHATEMIA: ICD-10-CM

## 2018-12-18 LAB
ANION GAP SERPL CALC-SCNC: 8 MMOL/L
BUN SERPL-MCNC: 24 MG/DL
CALCIUM SERPL-MCNC: 11.2 MG/DL
CHLORIDE SERPL-SCNC: 105 MMOL/L
CO2 SERPL-SCNC: 27 MMOL/L
CREAT SERPL-MCNC: 1.8 MG/DL
EST. GFR  (AFRICAN AMERICAN): 57.5 ML/MIN/1.73 M^2
EST. GFR  (NON AFRICAN AMERICAN): 49.8 ML/MIN/1.73 M^2
GLUCOSE SERPL-MCNC: 79 MG/DL
PHOSPHATE SERPL-MCNC: 2.4 MG/DL
POTASSIUM SERPL-SCNC: 4.2 MMOL/L
PTH-INTACT SERPL-MCNC: 321 PG/ML
SODIUM SERPL-SCNC: 140 MMOL/L

## 2018-12-18 PROCEDURE — 36415 COLL VENOUS BLD VENIPUNCTURE: CPT | Mod: PO

## 2018-12-18 PROCEDURE — 83970 ASSAY OF PARATHORMONE: CPT

## 2018-12-18 PROCEDURE — 84100 ASSAY OF PHOSPHORUS: CPT

## 2018-12-18 PROCEDURE — 80048 BASIC METABOLIC PNL TOTAL CA: CPT

## 2019-01-07 ENCOUNTER — PATIENT MESSAGE (OUTPATIENT)
Dept: ENDOCRINOLOGY | Facility: CLINIC | Age: 29
End: 2019-01-07

## 2019-02-13 ENCOUNTER — PATIENT MESSAGE (OUTPATIENT)
Dept: ENDOCRINOLOGY | Facility: CLINIC | Age: 29
End: 2019-02-13

## 2019-02-13 ENCOUNTER — TELEPHONE (OUTPATIENT)
Dept: ENDOCRINOLOGY | Facility: CLINIC | Age: 29
End: 2019-02-13

## 2019-02-13 NOTE — TELEPHONE ENCOUNTER
Spoke with pt and gave directions to the [facility] location.  Also advised pt that labs and US will be ordered if Dr. Santos deems necessary

## 2019-02-14 ENCOUNTER — OFFICE VISIT (OUTPATIENT)
Dept: ENDOCRINOLOGY | Facility: CLINIC | Age: 29
End: 2019-02-14
Payer: COMMERCIAL

## 2019-02-14 ENCOUNTER — LAB VISIT (OUTPATIENT)
Dept: LAB | Facility: HOSPITAL | Age: 29
End: 2019-02-14
Attending: INTERNAL MEDICINE
Payer: COMMERCIAL

## 2019-02-14 VITALS — BODY MASS INDEX: 22.81 KG/M2 | HEART RATE: 62 BPM | TEMPERATURE: 98 F | WEIGHT: 133.63 LBS | HEIGHT: 64 IN

## 2019-02-14 DIAGNOSIS — E83.39 HYPOPHOSPHATEMIA: ICD-10-CM

## 2019-02-14 DIAGNOSIS — N28.9 RENAL INSUFFICIENCY: ICD-10-CM

## 2019-02-14 DIAGNOSIS — E83.52 HYPERCALCEMIA: ICD-10-CM

## 2019-02-14 DIAGNOSIS — E21.0 HYPERPARATHYROIDISM, PRIMARY: ICD-10-CM

## 2019-02-14 DIAGNOSIS — E83.39 HYPOPHOSPHATEMIA: Primary | ICD-10-CM

## 2019-02-14 LAB
25(OH)D3+25(OH)D2 SERPL-MCNC: 24 NG/ML
ALBUMIN SERPL BCP-MCNC: 4.4 G/DL
ALBUMIN SERPL BCP-MCNC: 4.4 G/DL
ALP SERPL-CCNC: 60 U/L
ANION GAP SERPL CALC-SCNC: 6 MMOL/L
BUN SERPL-MCNC: 20 MG/DL
CALCIUM SERPL-MCNC: 11 MG/DL
CHLORIDE SERPL-SCNC: 104 MMOL/L
CO2 SERPL-SCNC: 29 MMOL/L
CREAT SERPL-MCNC: 1.4 MG/DL
EST. GFR  (AFRICAN AMERICAN): >60 ML/MIN/1.73 M^2
EST. GFR  (NON AFRICAN AMERICAN): >60 ML/MIN/1.73 M^2
GLUCOSE SERPL-MCNC: 85 MG/DL
PHOSPHATE SERPL-MCNC: 2.2 MG/DL
PHOSPHATE SERPL-MCNC: 2.2 MG/DL
POTASSIUM SERPL-SCNC: 4.5 MMOL/L
PTH-INTACT SERPL-MCNC: 343 PG/ML
SODIUM SERPL-SCNC: 139 MMOL/L

## 2019-02-14 PROCEDURE — 99214 OFFICE O/P EST MOD 30 MIN: CPT | Mod: S$GLB,,, | Performed by: INTERNAL MEDICINE

## 2019-02-14 PROCEDURE — 99999 PR PBB SHADOW E&M-EST. PATIENT-LVL IV: CPT | Mod: PBBFAC,,, | Performed by: INTERNAL MEDICINE

## 2019-02-14 PROCEDURE — 84075 ASSAY ALKALINE PHOSPHATASE: CPT

## 2019-02-14 PROCEDURE — 36415 COLL VENOUS BLD VENIPUNCTURE: CPT

## 2019-02-14 PROCEDURE — 3008F PR BODY MASS INDEX (BMI) DOCUMENTED: ICD-10-PCS | Mod: CPTII,S$GLB,, | Performed by: INTERNAL MEDICINE

## 2019-02-14 PROCEDURE — 3008F BODY MASS INDEX DOCD: CPT | Mod: CPTII,S$GLB,, | Performed by: INTERNAL MEDICINE

## 2019-02-14 PROCEDURE — 82306 VITAMIN D 25 HYDROXY: CPT

## 2019-02-14 PROCEDURE — 80069 RENAL FUNCTION PANEL: CPT

## 2019-02-14 PROCEDURE — 99999 PR PBB SHADOW E&M-EST. PATIENT-LVL IV: ICD-10-PCS | Mod: PBBFAC,,, | Performed by: INTERNAL MEDICINE

## 2019-02-14 PROCEDURE — 99214 PR OFFICE/OUTPT VISIT, EST, LEVL IV, 30-39 MIN: ICD-10-PCS | Mod: S$GLB,,, | Performed by: INTERNAL MEDICINE

## 2019-02-14 PROCEDURE — 83970 ASSAY OF PARATHORMONE: CPT

## 2019-02-14 NOTE — PROGRESS NOTES
Patient ID: Alex Stringer is a 29 y.o. male.  Patient is here for follow up        Chief Complaint: Abnormal Lab (hypophosphatemia)      HPI   Consultation was requested by Dr. Salinas Bailey    Patient carries a diagnosis of X linked hypophosphatemia since childhood.   He has not had the genetic studies however he has been seen by many specialists and he says usually by nephrologist.  He was followed by doctors at [facility] when he lived in [place].  He now lives in the [place] area.  I previously ordered FGF-23 levels and these were significantly high which helps to confirm his diagnosis.      He used to be on much more frequent and higher doses of calcitriol and phosphate(  Up to 2 g 6  times a day according to his  Notes from his pediatric nephrologist written in  January 2004) but when I saw him initially in July 2018 he reported taking  about 2 capsules of his calcitriol daily and his potassium phosphate he initially was taking 3 tablets twice a day.      He denies any bone pain or fractures except most recently in the past several months he has been experiencing pain in his T.  He did have bowing of his lower extremities growing  Up, but he says he did not have any severe dental problems     since living in [place] he has just had some time adjusting and knows he needs to do more socially, has been having some stress and anxiety but denies depression     when he was last seen by Dr. Bailey  It was noticed that his parathyroid hormone  Was elevated at 344, his calcium was at the higher end of normal and he had elevated 24 urine phosphorus    Since I have been seeing him his PTH remains elevated and his calcium levels have gradually increased and in December his GFR and creatinine worsened    His 24 urine was not elevated and in fact was low.  I recommended getting a renal ultrasound but he refused and I also recommended stopping his medication as he is an adult now and has completed his bone formation  and his therapy of calcitriol and phosphate in the presence of hyperparathyroidism may worsen that condition and would only be needed should he have issues with persistent bone pain or has evidence of fractures or if he is in need of Orthopedic surgery but he was reluctant to stop his medication completely    He is now down to Calcitriol one a day though at times may take 2 and kphos 2.5 to 3 a day               in review of records his last renal ultrasound was April 2012 and it showed no nephrolithiasis, he had a 1 cm left sided renal cyst at the lower pole.  Today as usual he is bit anxious and he states he       in review of records to prior to coming to [place] he was seeing a nephrologist in [place] and his calcium started becoming mildly elevated around 10.5 in 2011 and it appears his calcitriol dose would be adjusted downward.      He states he has seen a dentist who did not see any abnormalities in his bones.  He does not have any dental cavities    I also recommended in the past as a preventive method to take an x-ray of his lower extremities and ft areas to evaluate for any early arthritic degenerative or other bony process such as asymptomatic stress fracture but he declined    We discussed in the past and went over today my thoughts on Burosumab(Crysvita)- firstly it is controversial whether adult patient's should be treated but studies do show that it improved bone pain and feelings of well being and helps with myalgias as well and age in healing fractures and used for alleviating pain and healing fractures and sometimes can be used prior to an orthopedic surgery but should not be used in combination with Calcitriol or Upper phosphate or with significant renal insufficiency and this drug is not going to help his current issue of hyperparathyroidism and hypercalcemia and not to mention it is extreme expands    I have reviewed the past medical, family and social history    Review of Systems    Constitutional: Negative for appetite change, diaphoresis, fatigue, fever and unexpected weight change.   HENT: Negative for sore throat and trouble swallowing.         Pain in teeth   Eyes: Negative for visual disturbance.   Respiratory: Negative for shortness of breath and wheezing.    Cardiovascular: Negative for chest pain, palpitations and leg swelling.   Gastrointestinal: Negative for abdominal pain, diarrhea, nausea and vomiting.   Endocrine: Negative for cold intolerance, heat intolerance, polydipsia, polyphagia and polyuria.   Genitourinary: Negative for difficulty urinating and dysuria.   Musculoskeletal: Negative for arthralgias and joint swelling.   Skin: Negative for color change and rash.   Neurological: Negative for dizziness, tremors, numbness and headaches.   Hematological: Negative for adenopathy.   Psychiatric/Behavioral: Negative for confusion, dysphoric mood and sleep disturbance. The patient is not nervous/anxious.        Objective:      Physical Exam   Constitutional: He appears well-developed and well-nourished. No distress.   HENT:   Head: Normocephalic and atraumatic.   Eyes: Conjunctivae are normal. No scleral icterus.   Neurological: He is alert. No sensory deficit.        Skin: Skin is warm and dry. No rash noted. He is not diaphoretic. No erythema.   Psychiatric: He has a normal mood and affect. His behavior is normal.   Mood and affect are anxious and inappropriate at times   Vitals reviewed.        Lab Review:   Lab Visit on 12/18/2018   Component Date Value    PTH, Intact 12/18/2018 321.0*    Phosphorus 12/18/2018 2.4*    Sodium 12/18/2018 140     Potassium 12/18/2018 4.2     Chloride 12/18/2018 105     CO2 12/18/2018 27     Glucose 12/18/2018 79     BUN, Bld 12/18/2018 24*    Creatinine 12/18/2018 1.8*    Calcium 12/18/2018 11.2*    Anion Gap 12/18/2018 8     eGFR if  12/18/2018 57.5*    eGFR if non  Amer* 12/18/2018 49.8*   Lab Visit on  10/30/2018   Component Date Value    Sodium 10/30/2018 139     Potassium 10/30/2018 4.4     Chloride 10/30/2018 105     CO2 10/30/2018 28     Glucose 10/30/2018 92     BUN, Bld 10/30/2018 19     Creatinine 10/30/2018 1.3     Calcium 10/30/2018 10.9*    Anion Gap 10/30/2018 6*    eGFR if African American 10/30/2018 >60.0     eGFR if non African Amer* 10/30/2018 >60.0     Vit D, 25-Hydroxy 10/30/2018 27*    PTH, Intact 10/30/2018 362.0*    Phosphorus 10/30/2018 2.7     Albumin 10/30/2018 4.2     Alkaline Phos Bone Speci* 10/30/2018 13.6    Lab Visit on 08/28/2018   Component Date Value    PTH, Intact 08/28/2018 373.0*    Phosphorus 08/28/2018 1.3*    Vit D, 25-Hydroxy 08/28/2018 29*    Sodium 08/28/2018 137     Potassium 08/28/2018 4.3     Chloride 08/28/2018 104     CO2 08/28/2018 26     Glucose 08/28/2018 82     BUN, Bld 08/28/2018 20     Creatinine 08/28/2018 1.2     Calcium 08/28/2018 10.4     Anion Gap 08/28/2018 7*    eGFR if African American 08/28/2018 >60.0     eGFR if non African Amer* 08/28/2018 >60.0     Magnesium 08/28/2018 2.2     Fibroblast Growth Factor* 08/28/2018 1225*       Assessment:     1. Hypophosphatemia  Renal function panel    PTH, intact    Phosphorus    Vitamin D    Calcium, Timed Urine    Creatinine, urine, timed 24 Hours    NM Parathyroid Scan with SPECT Routine    US Soft Tissue Head Neck Thyroid    Alkaline phosphatase    Albumin    US Kidney   2. Hyperparathyroidism, primary  Renal function panel    PTH, intact    Phosphorus    Vitamin D    Calcium, Timed Urine    Creatinine, urine, timed 24 Hours    NM Parathyroid Scan with SPECT Routine    US Soft Tissue Head Neck Thyroid    Alkaline phosphatase    Albumin    US Kidney    DXA Bone Density Spine And Hip   3. Hypercalcemia  Renal function panel    PTH, intact    Phosphorus    Vitamin D    Calcium, Timed Urine    Creatinine, urine, timed 24 Hours    NM Parathyroid Scan with SPECT Routine    US Soft Tissue  Head Neck Thyroid    Alkaline phosphatase    Albumin    US Kidney   4. 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  This condition often will show multiple gland involvement and if so may require multiple gland parathyroidectomy.  He his calcium levels have worsened and he has developed renal insufficiency.  I will repeat labs and check another 24 urine calcium as well as assesses Z-score on bone density for any signs of bone loss.  Also he may have nephrocalcinosis that could be contributing to renal insufficiency.  I will also check imaging studies to localize parathyroid adenoma which will include parathyroid scan and thyroid ultrasound and now that he has renal insufficiency I recommended to patient to follow-up with his nephrologist    I also strongly recommended to patient to reduce calcitriol to 1 a day and K-Phos to 1 a day.  He does not want to completely stop them because he has more teeth pain he says    Can consider Sensipar if he is reluctant to have surgery  Plan:   Hypophosphatemia  -     Renal function panel; Future; Expected date: 02/14/2019  -     PTH, intact; Future; Expected date: 02/14/2019  -     Phosphorus; Future; Expected date: 02/14/2019  -     Vitamin D; Future; Expected date: 02/14/2019  -     Calcium, Timed Urine; Future  -     Creatinine, urine, timed 24 Hours; Future  -     NM Parathyroid Scan with SPECT Routine; Future; Expected date: 02/14/2019  -     US Soft Tissue Head Neck Thyroid; Future; Expected date: 02/14/2019  -     Alkaline phosphatase; Future; Expected date: 02/14/2019  -     Albumin; Future; Expected date: 02/14/2019  -     US Kidney; Future; Expected date: 02/14/2019    Hyperparathyroidism, primary  -     Renal function panel; Future; Expected date: 02/14/2019  -     PTH, intact; Future; Expected date: 02/14/2019  -     Phosphorus; Future; Expected date: 02/14/2019  -      Vitamin D; Future; Expected date: 02/14/2019  -     Calcium, Timed Urine; Future  -     Creatinine, urine, timed 24 Hours; Future  -     NM Parathyroid Scan with SPECT Routine; Future; Expected date: 02/14/2019  -     US Soft Tissue Head Neck Thyroid; Future; Expected date: 02/14/2019  -     Alkaline phosphatase; Future; Expected date: 02/14/2019  -     Albumin; Future; Expected date: 02/14/2019  -     US Kidney; Future; Expected date: 02/14/2019  -     DXA Bone Density Spine And Hip; Future; Expected date: 02/14/2019    Hypercalcemia  -     Renal function panel; Future; Expected date: 02/14/2019  -     PTH, intact; Future; Expected date: 02/14/2019  -     Phosphorus; Future; Expected date: 02/14/2019  -     Vitamin D; Future; Expected date: 02/14/2019  -     Calcium, Timed Urine; Future  -     Creatinine, urine, timed 24 Hours; Future  -     NM Parathyroid Scan with SPECT Routine; Future; Expected date: 02/14/2019  -     US Soft Tissue Head Neck Thyroid; Future; Expected date: 02/14/2019  -     Alkaline phosphatase; Future; Expected date: 02/14/2019  -     Albumin; Future; Expected date: 02/14/2019  -     US Kidney; Future; Expected date: 02/14/2019    Renal insufficiency          No Follow-up on file.    Labs prior to appointment? not applicable     Disclaimer:  This note may have been partially prepared using voice recognition software and  it may have not been extensively proofed, as such there could be errors within the text such as sound alike errors.

## 2019-02-14 NOTE — PATIENT INSTRUCTIONS
Make an appointment with Dr Bailey    Reduce calcitriol to one a day  And reduce  Kphos to one a day  Understanding Primary Hyperparathyroidism    The parathyroid glands are 4 tiny glands in the neck. They make parathyroid hormone (PTH). PTH controls the amount of calcium and phosphorus in your blood. Primary hyperparathyroidism is when there is too much PTH in your blood. It occurs when one or more of the glands are too active.  The job of PTH is to tell the body how to control calcium. Too much PTH means the body increases the amount of calcium in the blood. This leads to a problem called hypercalcemia. This is when the amount of calcium in the blood is too high. Hypercalcemia can cause serious health problems.  Causes  Hyperparathyroidism can occur when a parathyroid gland becomes enlarged. It can also occur as a complication of another health conditions, such as kidney failure or rickets. In these conditions, calcium is usually not high. This is called secondary hyperparathyroidism.  Whos at risk  The risk factors for this condition include:  · Being a woman (its less common in men)  · Being older (its more likely to occur with age)  · Having parents or siblings with the condition or other endocrine tumors  · Having certain kidney problems  · Taking certain medicines  · Having had radiation treatment in the head or neck  Symptoms  Symptoms of the condition can include:  · Muscle weakness  · Depression  · Tiredness  · Confusion and memory loss  · Poor memory  · Nausea and vomiting  · Pain in the stomach area (abdomen)  · Hard stools (constipation)  · Stomach ulcers  · Need to urinate often  · Kidney stones  · Joint or bone pain  · Bone disease (osteopenia or osteoporosis), an increase in bone fractures  · High blood pressure  · Increased thirst  Treatment  If primary hyperparathyroidism is not treated, it can get worse over time. Treatments include:  · Surgery. This may be done to remove any enlarged  parathyroid glands. This lets the amount of calcium in the blood go back to normal. You may need to take vitamin D and calcium supplements before the surgery. This will reduce the risk of low calcium after the surgery.   · Medicine. This lowers the amount of parathyroid hormone made by the overactive glands.   You and your healthcare provider can discuss your treatment options. Make sure to ask any questions you have.  Date Last Reviewed: 11/1/2016 © 2000-2017 [employer]. [address] [place] [address], [place] [address]. All rights reserved. This information is not intended as a substitute for professional medical care. Always follow your healthcare professional's instructions.

## 2019-03-03 ENCOUNTER — PATIENT MESSAGE (OUTPATIENT)
Dept: ENDOCRINOLOGY | Facility: CLINIC | Age: 29
End: 2019-03-03

## 2019-03-28 ENCOUNTER — TELEPHONE (OUTPATIENT)
Dept: RADIOLOGY | Facility: HOSPITAL | Age: 29
End: 2019-03-28

## 2021-06-01 ENCOUNTER — TELEPHONE (OUTPATIENT)
Dept: ENDOCRINOLOGY | Facility: CLINIC | Age: 31
End: 2021-06-01

## 2021-10-06 ENCOUNTER — OFFICE VISIT (OUTPATIENT)
Dept: ENDOCRINOLOGY | Facility: CLINIC | Age: 31
End: 2021-10-06
Payer: COMMERCIAL

## 2021-10-06 ENCOUNTER — PATIENT MESSAGE (OUTPATIENT)
Dept: ENDOCRINOLOGY | Facility: CLINIC | Age: 31
End: 2021-10-06

## 2021-10-06 VITALS
DIASTOLIC BLOOD PRESSURE: 84 MMHG | BODY MASS INDEX: 23.64 KG/M2 | SYSTOLIC BLOOD PRESSURE: 122 MMHG | RESPIRATION RATE: 18 BRPM | OXYGEN SATURATION: 99 % | HEIGHT: 64 IN | WEIGHT: 138.44 LBS | HEART RATE: 65 BPM

## 2021-10-06 DIAGNOSIS — E83.39 HYPOPHOSPHATEMIA: ICD-10-CM

## 2021-10-06 DIAGNOSIS — M90.80 X-LINKED HYPOPHOSPHATEMIC RICKETS: Primary | ICD-10-CM

## 2021-10-06 DIAGNOSIS — E83.31 X-LINKED HYPOPHOSPHATEMIC RICKETS: Primary | ICD-10-CM

## 2021-10-06 DIAGNOSIS — E21.2 TERTIARY HYPERPARATHYROIDISM: ICD-10-CM

## 2021-10-06 PROBLEM — E83.52 HYPERCALCEMIA: Status: RESOLVED | Noted: 2017-08-03 | Resolved: 2021-10-06

## 2021-10-06 PROCEDURE — 99999 PR PBB SHADOW E&M-EST. PATIENT-LVL III: ICD-10-PCS | Mod: PBBFAC,,, | Performed by: INTERNAL MEDICINE

## 2021-10-06 PROCEDURE — 99214 PR OFFICE/OUTPT VISIT, EST, LEVL IV, 30-39 MIN: ICD-10-PCS | Mod: S$PBB,,, | Performed by: INTERNAL MEDICINE

## 2021-10-06 PROCEDURE — 99999 PR PBB SHADOW E&M-EST. PATIENT-LVL III: CPT | Mod: PBBFAC,,, | Performed by: INTERNAL MEDICINE

## 2021-10-06 PROCEDURE — 99214 OFFICE O/P EST MOD 30 MIN: CPT | Mod: S$PBB,,, | Performed by: INTERNAL MEDICINE

## 2021-10-06 RX ORDER — CALCITRIOL 0.25 UG/1
0.25 CAPSULE ORAL 2 TIMES DAILY
Qty: 180 CAPSULE | Refills: 3
Start: 2021-10-06